# Patient Record
Sex: MALE | Race: BLACK OR AFRICAN AMERICAN | NOT HISPANIC OR LATINO | URBAN - METROPOLITAN AREA
[De-identification: names, ages, dates, MRNs, and addresses within clinical notes are randomized per-mention and may not be internally consistent; named-entity substitution may affect disease eponyms.]

---

## 2017-01-24 ENCOUNTER — APPOINTMENT (RX ONLY)
Dept: URBAN - METROPOLITAN AREA CLINIC 349 | Facility: CLINIC | Age: 32
Setting detail: DERMATOLOGY
End: 2017-01-24

## 2017-01-24 DIAGNOSIS — L70.0 ACNE VULGARIS: ICD-10-CM

## 2017-01-24 DIAGNOSIS — L74.51 PRIMARY FOCAL HYPERHIDROSIS: ICD-10-CM

## 2017-01-24 PROBLEM — L74.510 PRIMARY FOCAL HYPERHIDROSIS, AXILLA: Status: ACTIVE | Noted: 2017-01-24

## 2017-01-24 PROCEDURE — ? COUNSELING

## 2017-01-24 PROCEDURE — ? TREATMENT REGIMEN

## 2017-01-24 PROCEDURE — 99242 OFF/OP CONSLTJ NEW/EST SF 20: CPT

## 2017-01-24 PROCEDURE — ? PRESCRIPTION

## 2017-01-24 RX ORDER — CLINDAMYCIN PHOSPHATE 10 MG/ML
SOLUTION TOPICAL
Qty: 1 | Refills: 2 | Status: ERX | COMMUNITY
Start: 2017-01-24

## 2017-01-24 RX ORDER — BENZOYL PEROXIDE 9.8 G/100G
AEROSOL, FOAM TOPICAL
Qty: 1 | Refills: 2 | Status: ERX | COMMUNITY
Start: 2017-01-24

## 2017-01-24 RX ORDER — GLYCOPYRROLATE 1 MG/1
TABLET ORAL
Qty: 30 | Refills: 2 | Status: ERX | COMMUNITY
Start: 2017-01-24

## 2017-01-24 RX ADMIN — GLYCOPYRROLATE: 1 TABLET ORAL at 17:07

## 2017-01-24 RX ADMIN — CLINDAMYCIN PHOSPHATE: 10 SOLUTION TOPICAL at 17:08

## 2017-01-24 RX ADMIN — BENZOYL PEROXIDE: 9.8 AEROSOL, FOAM TOPICAL at 17:09

## 2017-01-24 ASSESSMENT — LOCATION DETAILED DESCRIPTION DERM
LOCATION DETAILED: INFERIOR MID FOREHEAD
LOCATION DETAILED: LEFT LATERAL MALAR CHEEK
LOCATION DETAILED: LEFT AXILLARY VAULT
LOCATION DETAILED: LEFT POSTERIOR SHOULDER
LOCATION DETAILED: LEFT CHIN
LOCATION DETAILED: RIGHT CENTRAL MALAR CHEEK
LOCATION DETAILED: RIGHT AXILLARY VAULT
LOCATION DETAILED: RIGHT POSTERIOR SHOULDER

## 2017-01-24 ASSESSMENT — LOCATION SIMPLE DESCRIPTION DERM
LOCATION SIMPLE: RIGHT AXILLARY VAULT
LOCATION SIMPLE: LEFT SHOULDER
LOCATION SIMPLE: LEFT CHEEK
LOCATION SIMPLE: CHIN
LOCATION SIMPLE: INFERIOR FOREHEAD
LOCATION SIMPLE: LEFT AXILLARY VAULT
LOCATION SIMPLE: RIGHT CHEEK
LOCATION SIMPLE: RIGHT SHOULDER

## 2017-01-24 ASSESSMENT — LOCATION ZONE DERM
LOCATION ZONE: AXILLAE
LOCATION ZONE: ARM
LOCATION ZONE: FACE

## 2017-01-24 NOTE — HPI: SWEATING (HYPERHIDROSIS)
How Severe Is It?: moderate
Sweating Severity Scale: 3- The sweating is barely tolerable and frequently interferes with daily activities
Is This A New Presentation, Or A Follow-Up?: Sweating

## 2017-01-24 NOTE — PROCEDURE: TREATMENT REGIMEN
Initiate Treatment: Certin dri to use at night time.  Robinal to take 1 PO QD
Detail Level: Detailed
Continue Regimen: Current regimen of Benzepro foam to use pre shower leaving 3-5 minutes then rinsing off

## 2017-02-02 ENCOUNTER — HOSPITAL ENCOUNTER (OUTPATIENT)
Dept: CT IMAGING | Age: 32
Discharge: HOME | End: 2017-02-02
Attending: UROLOGY
Payer: COMMERCIAL

## 2017-02-02 DIAGNOSIS — R31.29 MICROSCOPIC HEMATURIA: ICD-10-CM

## 2017-02-02 PROCEDURE — 74011636320 HC RX REV CODE- 636/320: Performed by: UROLOGY

## 2017-02-02 PROCEDURE — 74011000258 HC RX REV CODE- 258: Performed by: UROLOGY

## 2017-02-02 PROCEDURE — 74178 CT ABD&PLV WO CNTR FLWD CNTR: CPT

## 2017-02-02 RX ORDER — SODIUM CHLORIDE 0.9 % (FLUSH) 0.9 %
10 SYRINGE (ML) INJECTION
Status: COMPLETED | OUTPATIENT
Start: 2017-02-02 | End: 2017-02-02

## 2017-02-02 RX ADMIN — IOPAMIDOL 100 ML: 755 INJECTION, SOLUTION INTRAVENOUS at 16:07

## 2017-02-02 RX ADMIN — Medication 10 ML: at 16:07

## 2017-02-02 RX ADMIN — SODIUM CHLORIDE 100 ML: 900 INJECTION, SOLUTION INTRAVENOUS at 16:08

## 2017-03-20 PROBLEM — G47.10 HYPERSOMNIA: Status: ACTIVE | Noted: 2017-03-20

## 2017-03-20 PROBLEM — R63.5 WEIGHT GAIN: Status: ACTIVE | Noted: 2017-03-20

## 2017-03-20 PROBLEM — R29.818 SUSPECTED SLEEP APNEA: Status: ACTIVE | Noted: 2017-03-20

## 2017-03-21 ENCOUNTER — APPOINTMENT (RX ONLY)
Dept: URBAN - METROPOLITAN AREA CLINIC 349 | Facility: CLINIC | Age: 32
Setting detail: DERMATOLOGY
End: 2017-03-21

## 2017-03-21 DIAGNOSIS — L28.0 LICHEN SIMPLEX CHRONICUS: ICD-10-CM

## 2017-03-21 DIAGNOSIS — L74.51 PRIMARY FOCAL HYPERHIDROSIS: ICD-10-CM

## 2017-03-21 DIAGNOSIS — L70.0 ACNE VULGARIS: ICD-10-CM

## 2017-03-21 PROBLEM — L74.510 PRIMARY FOCAL HYPERHIDROSIS, AXILLA: Status: ACTIVE | Noted: 2017-03-21

## 2017-03-21 PROCEDURE — ? INTRALESIONAL KENALOG

## 2017-03-21 PROCEDURE — ? OTHER

## 2017-03-21 PROCEDURE — ? TREATMENT REGIMEN

## 2017-03-21 PROCEDURE — 99214 OFFICE O/P EST MOD 30 MIN: CPT | Mod: 25

## 2017-03-21 PROCEDURE — ? COUNSELING

## 2017-03-21 PROCEDURE — 11900 INJECT SKIN LESIONS </W 7: CPT

## 2017-03-21 PROCEDURE — ? PRESCRIPTION

## 2017-03-21 RX ORDER — GLYCOPYRROLATE 1 MG/1
TABLET ORAL
Qty: 60 | Refills: 2 | Status: ERX

## 2017-03-21 RX ORDER — BENZOYL PEROXIDE 9.8 G/100G
AEROSOL, FOAM TOPICAL
Qty: 1 | Refills: 3 | Status: ERX

## 2017-03-21 ASSESSMENT — LOCATION SIMPLE DESCRIPTION DERM
LOCATION SIMPLE: RIGHT BUTTOCK
LOCATION SIMPLE: LEFT AXILLARY VAULT
LOCATION SIMPLE: RIGHT CHEEK
LOCATION SIMPLE: CHIN
LOCATION SIMPLE: LEFT CHEEK
LOCATION SIMPLE: INFERIOR FOREHEAD
LOCATION SIMPLE: RIGHT AXILLARY VAULT

## 2017-03-21 ASSESSMENT — LOCATION ZONE DERM
LOCATION ZONE: TRUNK
LOCATION ZONE: AXILLAE
LOCATION ZONE: FACE

## 2017-03-21 ASSESSMENT — LOCATION DETAILED DESCRIPTION DERM
LOCATION DETAILED: INFERIOR MID FOREHEAD
LOCATION DETAILED: LEFT CENTRAL MALAR CHEEK
LOCATION DETAILED: RIGHT MEDIAL BUTTOCK
LOCATION DETAILED: LEFT CHIN
LOCATION DETAILED: RIGHT CENTRAL MALAR CHEEK
LOCATION DETAILED: RIGHT AXILLARY VAULT
LOCATION DETAILED: LEFT AXILLARY VAULT

## 2017-03-21 NOTE — PROCEDURE: TREATMENT REGIMEN
Continue Regimen: Current regimen of BenEfoam
Detail Level: Detailed
Initiate Treatment: Start Clindamycin solution
Modify Regimen: Increase Robinal to twice daily
Continue Regimen: Current regimen of Drysol

## 2017-03-21 NOTE — PROCEDURE: OTHER
Detail Level: Zone
Note Text (......Xxx Chief Complaint.): Photographs taken with pt permission
Other (Free Text): Will follow up in 1 month to check area

## 2017-04-27 ENCOUNTER — APPOINTMENT (RX ONLY)
Dept: URBAN - METROPOLITAN AREA CLINIC 349 | Facility: CLINIC | Age: 32
Setting detail: DERMATOLOGY
End: 2017-04-27

## 2017-04-27 DIAGNOSIS — L70.0 ACNE VULGARIS: ICD-10-CM

## 2017-04-27 DIAGNOSIS — L28.0 LICHEN SIMPLEX CHRONICUS: ICD-10-CM

## 2017-04-27 DIAGNOSIS — L74.51 PRIMARY FOCAL HYPERHIDROSIS: ICD-10-CM

## 2017-04-27 PROBLEM — L74.510 PRIMARY FOCAL HYPERHIDROSIS, AXILLA: Status: ACTIVE | Noted: 2017-04-27

## 2017-04-27 PROCEDURE — ? COUNSELING

## 2017-04-27 PROCEDURE — ? OTHER

## 2017-04-27 PROCEDURE — ? PRESCRIPTION

## 2017-04-27 PROCEDURE — 99213 OFFICE O/P EST LOW 20 MIN: CPT

## 2017-04-27 RX ORDER — TRIAMCINOLONE ACETONIDE 1 MG/G
CREAM TOPICAL BID
Qty: 1 | Refills: 2 | Status: ERX | COMMUNITY
Start: 2017-04-27

## 2017-04-27 RX ORDER — CLINDAMYCIN PHOSPHATE 10 MG/ML
SOLUTION TOPICAL
Qty: 1 | Refills: 6 | Status: ERX | COMMUNITY
Start: 2017-04-27

## 2017-04-27 RX ADMIN — TRIAMCINOLONE ACETONIDE: 1 CREAM TOPICAL at 15:36

## 2017-04-27 RX ADMIN — CLINDAMYCIN PHOSPHATE: 10 SOLUTION TOPICAL at 15:34

## 2017-04-27 ASSESSMENT — LOCATION SIMPLE DESCRIPTION DERM
LOCATION SIMPLE: LEFT EAR
LOCATION SIMPLE: LEFT AXILLARY VAULT
LOCATION SIMPLE: RIGHT SHOULDER
LOCATION SIMPLE: CHIN
LOCATION SIMPLE: LEFT SHOULDER
LOCATION SIMPLE: CHEST
LOCATION SIMPLE: RIGHT AXILLARY VAULT
LOCATION SIMPLE: RIGHT CHEEK

## 2017-04-27 ASSESSMENT — LOCATION ZONE DERM
LOCATION ZONE: TRUNK
LOCATION ZONE: FACE
LOCATION ZONE: AXILLAE
LOCATION ZONE: ARM
LOCATION ZONE: EAR

## 2017-04-27 ASSESSMENT — LOCATION DETAILED DESCRIPTION DERM
LOCATION DETAILED: RIGHT LATERAL SUPERIOR CHEST
LOCATION DETAILED: RIGHT POSTERIOR SHOULDER
LOCATION DETAILED: LEFT AXILLARY VAULT
LOCATION DETAILED: LEFT POSTERIOR SHOULDER
LOCATION DETAILED: RIGHT INFERIOR CENTRAL MALAR CHEEK
LOCATION DETAILED: LEFT LATERAL SUPERIOR CHEST
LOCATION DETAILED: LEFT TRAGUS
LOCATION DETAILED: RIGHT AXILLARY VAULT
LOCATION DETAILED: LEFT CHIN

## 2017-04-27 NOTE — PROCEDURE: OTHER
Detail Level: Detailed
Note Text (......Xxx Chief Complaint.): Photographs taken with pt permission
Other (Free Text): Patient advised that if he calls for a topical for the underarms we can call something in.Pt find out if he was using certain dry or Drysol.

## 2017-07-06 PROBLEM — Z20.2 SEXUALLY TRANSMITTED DISEASE EXPOSURE: Status: ACTIVE | Noted: 2017-07-06

## 2017-07-06 PROBLEM — Z20.2 SEXUALLY TRANSMITTED DISEASE EXPOSURE: Status: RESOLVED | Noted: 2017-07-06 | Resolved: 2017-07-06

## 2017-12-12 PROBLEM — R63.5 WEIGHT GAIN: Status: RESOLVED | Noted: 2017-03-20 | Resolved: 2017-12-12

## 2017-12-13 ENCOUNTER — HOSPITAL ENCOUNTER (OUTPATIENT)
Dept: CT IMAGING | Age: 32
Discharge: HOME | End: 2017-12-13
Attending: UROLOGY
Payer: COMMERCIAL

## 2017-12-13 DIAGNOSIS — N20.0 KIDNEY STONE: ICD-10-CM

## 2017-12-13 PROCEDURE — 74176 CT ABD & PELVIS W/O CONTRAST: CPT

## 2017-12-13 NOTE — PROGRESS NOTES
Please inform the patient that there is an 8 mm linear stone without obstruction to the kidney this may be in the lower aspect of the right ureter or it could even be in the bladder at this point. Based on his symptoms I suspect it is still in the ureter.   I would recommend he be set up for cystoscopy and probable right ureteroscopy and laser lithotripsy and right stent

## 2017-12-18 ENCOUNTER — ANESTHESIA EVENT (OUTPATIENT)
Dept: SURGERY | Age: 32
End: 2017-12-18
Payer: COMMERCIAL

## 2017-12-19 ENCOUNTER — HOSPITAL ENCOUNTER (OUTPATIENT)
Age: 32
Setting detail: OUTPATIENT SURGERY
Discharge: HOME | End: 2017-12-19
Attending: UROLOGY | Admitting: UROLOGY
Payer: COMMERCIAL

## 2017-12-19 ENCOUNTER — ANESTHESIA (OUTPATIENT)
Dept: SURGERY | Age: 32
End: 2017-12-19
Payer: COMMERCIAL

## 2017-12-19 VITALS
HEIGHT: 70 IN | OXYGEN SATURATION: 100 % | RESPIRATION RATE: 17 BRPM | DIASTOLIC BLOOD PRESSURE: 86 MMHG | TEMPERATURE: 98.3 F | SYSTOLIC BLOOD PRESSURE: 128 MMHG | BODY MASS INDEX: 23.05 KG/M2 | WEIGHT: 161 LBS | HEART RATE: 83 BPM

## 2017-12-19 PROCEDURE — 76210000006 HC OR PH I REC 0.5 TO 1 HR: Performed by: UROLOGY

## 2017-12-19 PROCEDURE — C1769 GUIDE WIRE: HCPCS | Performed by: UROLOGY

## 2017-12-19 PROCEDURE — 77030018832 HC SOL IRR H20 ICUM -A: Performed by: UROLOGY

## 2017-12-19 PROCEDURE — 74011250637 HC RX REV CODE- 250/637: Performed by: ANESTHESIOLOGY

## 2017-12-19 PROCEDURE — 74011250636 HC RX REV CODE- 250/636: Performed by: ANESTHESIOLOGY

## 2017-12-19 PROCEDURE — 77030020782 HC GWN BAIR PAWS FLX 3M -B: Performed by: ANESTHESIOLOGY

## 2017-12-19 PROCEDURE — C1758 CATHETER, URETERAL: HCPCS | Performed by: UROLOGY

## 2017-12-19 PROCEDURE — 77030032490 HC SLV COMPR SCD KNE COVD -B: Performed by: UROLOGY

## 2017-12-19 PROCEDURE — 76010000149 HC OR TIME 1 TO 1.5 HR: Performed by: UROLOGY

## 2017-12-19 PROCEDURE — 74011250636 HC RX REV CODE- 250/636

## 2017-12-19 PROCEDURE — 77030020143 HC AIRWY LARYN INTUB CGAS -A: Performed by: ANESTHESIOLOGY

## 2017-12-19 PROCEDURE — 74011250636 HC RX REV CODE- 250/636: Performed by: UROLOGY

## 2017-12-19 PROCEDURE — 77030019927 HC TBNG IRR CYSTO BAXT -A: Performed by: UROLOGY

## 2017-12-19 PROCEDURE — 76060000033 HC ANESTHESIA 1 TO 1.5 HR: Performed by: UROLOGY

## 2017-12-19 PROCEDURE — 74011000250 HC RX REV CODE- 250: Performed by: UROLOGY

## 2017-12-19 PROCEDURE — 76210000020 HC REC RM PH II FIRST 0.5 HR: Performed by: UROLOGY

## 2017-12-19 PROCEDURE — 74011000250 HC RX REV CODE- 250

## 2017-12-19 PROCEDURE — C2617 STENT, NON-COR, TEM W/O DEL: HCPCS | Performed by: UROLOGY

## 2017-12-19 DEVICE — URETERAL STENT
Type: IMPLANTABLE DEVICE | Site: URETER | Status: FUNCTIONAL
Brand: PERCUFLEX™ PLUS

## 2017-12-19 RX ORDER — FENTANYL CITRATE 50 UG/ML
INJECTION, SOLUTION INTRAMUSCULAR; INTRAVENOUS AS NEEDED
Status: DISCONTINUED | OUTPATIENT
Start: 2017-12-19 | End: 2017-12-19 | Stop reason: HOSPADM

## 2017-12-19 RX ORDER — CIPROFLOXACIN 500 MG/1
500 TABLET ORAL 2 TIMES DAILY
Qty: 14 TAB | Refills: 0 | Status: SHIPPED | OUTPATIENT
Start: 2017-12-19 | End: 2018-01-04

## 2017-12-19 RX ORDER — ONDANSETRON 2 MG/ML
INJECTION INTRAMUSCULAR; INTRAVENOUS AS NEEDED
Status: DISCONTINUED | OUTPATIENT
Start: 2017-12-19 | End: 2017-12-19 | Stop reason: HOSPADM

## 2017-12-19 RX ORDER — OXYCODONE HYDROCHLORIDE 5 MG/1
10 TABLET ORAL
Status: DISCONTINUED | OUTPATIENT
Start: 2017-12-19 | End: 2018-01-11 | Stop reason: HOSPADM

## 2017-12-19 RX ORDER — ACETAMINOPHEN 500 MG
1000 TABLET ORAL ONCE
Status: COMPLETED | OUTPATIENT
Start: 2017-12-19 | End: 2017-12-19

## 2017-12-19 RX ORDER — PROPOFOL 10 MG/ML
INJECTION, EMULSION INTRAVENOUS AS NEEDED
Status: DISCONTINUED | OUTPATIENT
Start: 2017-12-19 | End: 2017-12-19 | Stop reason: HOSPADM

## 2017-12-19 RX ORDER — SODIUM CHLORIDE, SODIUM LACTATE, POTASSIUM CHLORIDE, CALCIUM CHLORIDE 600; 310; 30; 20 MG/100ML; MG/100ML; MG/100ML; MG/100ML
75 INJECTION, SOLUTION INTRAVENOUS CONTINUOUS
Status: DISCONTINUED | OUTPATIENT
Start: 2017-12-19 | End: 2018-01-11 | Stop reason: HOSPADM

## 2017-12-19 RX ORDER — LIDOCAINE HYDROCHLORIDE 10 MG/ML
0.1 INJECTION INFILTRATION; PERINEURAL AS NEEDED
Status: DISCONTINUED | OUTPATIENT
Start: 2017-12-19 | End: 2017-12-19 | Stop reason: HOSPADM

## 2017-12-19 RX ORDER — SODIUM CHLORIDE, SODIUM LACTATE, POTASSIUM CHLORIDE, CALCIUM CHLORIDE 600; 310; 30; 20 MG/100ML; MG/100ML; MG/100ML; MG/100ML
75 INJECTION, SOLUTION INTRAVENOUS CONTINUOUS
Status: DISCONTINUED | OUTPATIENT
Start: 2017-12-19 | End: 2017-12-19 | Stop reason: HOSPADM

## 2017-12-19 RX ORDER — NALOXONE HYDROCHLORIDE 0.4 MG/ML
0.1 INJECTION, SOLUTION INTRAMUSCULAR; INTRAVENOUS; SUBCUTANEOUS
Status: DISCONTINUED | OUTPATIENT
Start: 2017-12-19 | End: 2018-01-11 | Stop reason: HOSPADM

## 2017-12-19 RX ORDER — MIDAZOLAM HYDROCHLORIDE 1 MG/ML
2 INJECTION, SOLUTION INTRAMUSCULAR; INTRAVENOUS
Status: COMPLETED | OUTPATIENT
Start: 2017-12-19 | End: 2017-12-19

## 2017-12-19 RX ORDER — HYDROMORPHONE HYDROCHLORIDE 2 MG/ML
0.5 INJECTION, SOLUTION INTRAMUSCULAR; INTRAVENOUS; SUBCUTANEOUS
Status: DISCONTINUED | OUTPATIENT
Start: 2017-12-19 | End: 2018-01-11 | Stop reason: HOSPADM

## 2017-12-19 RX ORDER — PHENAZOPYRIDINE HYDROCHLORIDE 200 MG/1
200 TABLET, FILM COATED ORAL
Qty: 12 TAB | Refills: 0 | Status: SHIPPED | OUTPATIENT
Start: 2017-12-19 | End: 2017-12-23

## 2017-12-19 RX ORDER — OXYCODONE HYDROCHLORIDE 5 MG/1
5 TABLET ORAL
Status: DISCONTINUED | OUTPATIENT
Start: 2017-12-19 | End: 2018-01-11 | Stop reason: HOSPADM

## 2017-12-19 RX ORDER — DIPHENHYDRAMINE HYDROCHLORIDE 50 MG/ML
12.5 INJECTION, SOLUTION INTRAMUSCULAR; INTRAVENOUS
Status: DISCONTINUED | OUTPATIENT
Start: 2017-12-19 | End: 2018-01-11 | Stop reason: HOSPADM

## 2017-12-19 RX ORDER — LIDOCAINE HYDROCHLORIDE 20 MG/ML
JELLY TOPICAL AS NEEDED
Status: DISCONTINUED | OUTPATIENT
Start: 2017-12-19 | End: 2017-12-19 | Stop reason: HOSPADM

## 2017-12-19 RX ORDER — FLUMAZENIL 0.1 MG/ML
0.2 INJECTION INTRAVENOUS AS NEEDED
Status: DISCONTINUED | OUTPATIENT
Start: 2017-12-19 | End: 2018-01-11 | Stop reason: HOSPADM

## 2017-12-19 RX ORDER — OXYCODONE AND ACETAMINOPHEN 7.5; 325 MG/1; MG/1
1 TABLET ORAL
Qty: 30 TAB | Refills: 0 | Status: SHIPPED | OUTPATIENT
Start: 2017-12-19 | End: 2018-01-04

## 2017-12-19 RX ORDER — DEXAMETHASONE SODIUM PHOSPHATE 4 MG/ML
INJECTION, SOLUTION INTRA-ARTICULAR; INTRALESIONAL; INTRAMUSCULAR; INTRAVENOUS; SOFT TISSUE AS NEEDED
Status: DISCONTINUED | OUTPATIENT
Start: 2017-12-19 | End: 2017-12-19 | Stop reason: HOSPADM

## 2017-12-19 RX ORDER — CIPROFLOXACIN 2 MG/ML
400 INJECTION, SOLUTION INTRAVENOUS ONCE
Status: COMPLETED | OUTPATIENT
Start: 2017-12-19 | End: 2017-12-19

## 2017-12-19 RX ORDER — LIDOCAINE HYDROCHLORIDE 20 MG/ML
INJECTION, SOLUTION EPIDURAL; INFILTRATION; INTRACAUDAL; PERINEURAL AS NEEDED
Status: DISCONTINUED | OUTPATIENT
Start: 2017-12-19 | End: 2017-12-19 | Stop reason: HOSPADM

## 2017-12-19 RX ADMIN — FENTANYL CITRATE 50 MCG: 50 INJECTION, SOLUTION INTRAMUSCULAR; INTRAVENOUS at 09:36

## 2017-12-19 RX ADMIN — OXYCODONE HYDROCHLORIDE 5 MG: 5 TABLET ORAL at 10:55

## 2017-12-19 RX ADMIN — ONDANSETRON 4 MG: 2 INJECTION INTRAMUSCULAR; INTRAVENOUS at 10:10

## 2017-12-19 RX ADMIN — SODIUM CHLORIDE, SODIUM LACTATE, POTASSIUM CHLORIDE, AND CALCIUM CHLORIDE 75 ML/HR: 600; 310; 30; 20 INJECTION, SOLUTION INTRAVENOUS at 07:58

## 2017-12-19 RX ADMIN — ACETAMINOPHEN 1000 MG: 500 TABLET, FILM COATED ORAL at 07:57

## 2017-12-19 RX ADMIN — MIDAZOLAM HYDROCHLORIDE 2 MG: 1 INJECTION, SOLUTION INTRAMUSCULAR; INTRAVENOUS at 09:20

## 2017-12-19 RX ADMIN — DEXAMETHASONE SODIUM PHOSPHATE 4 MG: 4 INJECTION, SOLUTION INTRA-ARTICULAR; INTRALESIONAL; INTRAMUSCULAR; INTRAVENOUS; SOFT TISSUE at 10:10

## 2017-12-19 RX ADMIN — PROPOFOL 200 MG: 10 INJECTION, EMULSION INTRAVENOUS at 09:36

## 2017-12-19 RX ADMIN — LIDOCAINE HYDROCHLORIDE 60 MG: 20 INJECTION, SOLUTION EPIDURAL; INFILTRATION; INTRACAUDAL; PERINEURAL at 09:36

## 2017-12-19 RX ADMIN — CIPROFLOXACIN 400 MG: 2 INJECTION, SOLUTION INTRAVENOUS at 09:33

## 2017-12-19 NOTE — IP AVS SNAPSHOT
[address] [facility] 
 
 
 [address] 
[phone] Patient: Kellee Hope MRN: [id number] :1985 About your hospitalization You were admitted on:  2017 You last received care in the:  [facility] SURGERY You were discharged on:  2017 Why you were hospitalized Your primary diagnosis was:  Not on File Things You Need To Do (next 8 weeks) Follow up with Aida Watkins MD  
  
Phone:  [phone] Where:  [address]  Office Visit with Jen Mercedes MD at  4:10 PM  
Where:  [facility] ([address]) Discharge Orders None A check beata indicates which time of day the medication should be taken. My Medications TAKE these medications as instructed Instructions Each Dose to Equal  
 Morning Noon Evening Bedtime  
 ciprofloxacin HCl 500 mg tablet Commonly known as:  CIPRO Your last dose was: Your next dose is: Take 1 Tab by mouth two (2) times a day. 500 mg HYDROcodone-acetaminophen  mg tablet Commonly known as:  Zilphia Deck Your last dose was: Your next dose is: Take 1 Tab by mouth every six (6) hours as needed for Pain. Max Daily Amount: 4 Tabs. 1 Tab  
    
   
   
   
  
 oxyCODONE-acetaminophen 7.5-325 mg per tablet Commonly known as:  PERCOCET 7.5 Your last dose was: Your next dose is: Take 1 Tab by mouth every four (4) hours as needed for Pain. Max Daily Amount: 6 Tabs. 1 Tab  
    
   
   
   
  
 phenazopyridine 200 mg tablet Commonly known as:  PYRIDIUM Your last dose was: Your next dose is: Take 1 Tab by mouth three (3) times daily as needed for Pain for up to 4 days.  Indications: Dysuria  
 200 mg  
    
   
   
   
 traMADol 50 mg tablet Commonly known as:  ULTRAM  
   
Your last dose was: Your next dose is: Take 1 Tab by mouth every six (6) hours as needed for Pain. Max Daily Amount: 200 mg.  
 50 mg Where to Get Your Medications Information on where to get these meds will be given to you by the nurse or doctor. ! Ask your nurse or doctor about these medications  
  ciprofloxacin HCl 500 mg tablet  
 oxyCODONE-acetaminophen 7.5-325 mg per tablet  
 phenazopyridine 200 mg tablet Discharge Instructions Ureteral Stent Placement: What to Expect at Home Your Recovery A ureteral (say \"you-REE-ter-ul\") stent is a thin, hollow tube that is placed in the ureter to help urine pass from the kidney into the bladder. Ureters are the tubes that connect the kidneys to the bladder. You may have a small amount of blood in your urine for 1 to 3 days after the procedure. While the stent is in place, you may have to urinate more often, feel a sudden need to urinate, or feel like you cannot completely empty your bladder. You may feel some pain when you urinate or do strenuous activity. You also may notice a small amount of blood in your urine after strenuous activities. These side effects usually do not prevent people from doing their normal daily activities. Your urethra is the tube that carries urine from your bladder to outside your body. This string is attached to the stent. Try not to pull on the string. The doctor will use the string to pull out the stent when you no longer need it. After the procedure, urine may flow better from your kidneys to your bladder. A ureteral stent may be left in place for several days or for as long as several months. Your doctor will take it out when you no longer need it. This care sheet gives you a general idea about how long it will take for you to recover. But each person recovers at a different pace.  Follow the steps below to get better as quickly as possible. Cystoscopy: What to Expect at [facility] Your Recovery A cystoscopy is a procedure that lets a doctor look inside of the bladder and the urethra. The urethra is the tube that carries urine from the bladder to outside the body. The doctor uses a thin, lighted tube called a cystoscope to look for kidney or bladder stones, tumors, bleeding, or infection. After the cystoscopy, your urethra may be sore at first, and it may burn when you urinate for the first few days after the procedure. You may feel the need to urinate more often, and your urine may be pink. These symptoms should get better in 1 or 2 days. You will probably be able to go back to work or most of your usual activities in 1 or 2 days. This care sheet gives you a general idea about how long it will take for you to recover. But each person recovers at a different pace. Follow the steps below to get better as quickly as possible. How can you care for yourself at home? Activity 1. Rest when you feel tired. Getting enough sleep will help you recover. 2. Try to walk each day. Start by walking a little more than you did the day before. Bit by bit, increase the amount you walk. Walking boosts blood flow and helps prevent pneumonia and constipation. 3. Avoid strenuous activities, such as bicycle riding, jogging, weight lifting, or aerobic exercise, until your doctor says it is okay. 4. Ask your doctor when you can drive again. 5. Most people are able to return to work within 1 or 2 days after the procedure. 6. You may shower and take baths as usual. 
7. Ask your doctor when it is okay for you to have sex. Diet · You can eat your normal diet. If your stomach is upset, try bland, low-fat foods like plain rice, broiled chicken, toast, and yogurt. · Drink plenty of fluids (unless your doctor tells you not to). Medicines · Take pain medicines exactly as directed. ¨ If the doctor gave you a prescription medicine for pain, take it as prescribed. ¨ If you are not taking a prescription pain medicine, ask your doctor if you can take an over-the-counter medicine. · If you think your pain medicine is making you sick to your stomach: 
¨ Take your medicine after meals (unless your doctor has told you not to). ¨ Ask your doctor for a different pain medicine. · If your doctor prescribed antibiotics, take them as directed. Do not stop taking them just because you feel better. You need to take the full course of antibiotics. Follow-up care is a key part of your treatment and safety. Be sure to make and go to all appointments, and call your doctor if you are having problems. It's also a good idea to know your test results and keep a list of the medicines you take. When should you call for help? Call 911 anytime you think you may need emergency care. For example, call if: 
· You passed out (lost consciousness). · You have severe trouble breathing. · You have sudden chest pain and shortness of breath, or you cough up blood. · You have severe belly pain. Call your doctor now or seek immediate medical care if: 
· You are sick to your stomach or cannot keep fluids down. · Your urine is still red or you see blood clots after you have urinated several times. · You have trouble passing urine or stool, especially if you have pain or swelling in your lower belly. · You have signs of a blood clot, such as: 
¨ Pain in your calf, back of the knee, thigh, or groin. ¨ Redness and swelling in your leg or groin. · You develop a fever or severe chills. · You have pain in your back just below your rib cage. This is called flank pain. Watch closely for changes in your health, and be sure to contact your doctor if: 
· You have pain or burning when you urinate. A burning feeling is normal for a day or two after the test, but call if it does not get better. · You have a frequent urge to urinate but can pass only small amounts of urine. Your urine is pink, red, or cloudy, or smells bad. It is normal for the urine to have a pinkish color for a few days after the test, but call if it does not get better. After general anesthesia or intravenous sedation, for 24 hours or while taking prescription Narcotics: · Limit your activities · Do not drive and operate hazardous machinery · Do not make important personal or business decisions · Do  not drink alcoholic beverages · If you have not urinated within 8 hours after discharge, please contact your surgeon on call. *  Please give a list of your current medications to your Primary Care Provider. *  Please update this list whenever your medications are discontinued, doses are 
    changed, or new medications (including over-the-counter products) are added. *  Please carry medication information at all times in case of emergency situations. These are general instructions for a healthy lifestyle: No smoking/ No tobacco products/ Avoid exposure to second hand smoke Surgeon General's Warning:  Quitting smoking now greatly reduces serious risk to your health. Obesity, smoking, and sedentary lifestyle greatly increases your risk for illness A healthy diet, regular physical exercise & weight monitoring are important for maintaining a healthy lifestyle You may be retaining fluid if you have a history of heart failure or if you experience any of the following symptoms:  Weight gain of 3 pounds or more overnight or 5 pounds in a week, increased swelling in our hands or feet or shortness of breath while lying flat in bed. Please call your doctor as soon as you notice any of these symptoms; do not wait until your next office visit. Recognize signs and symptoms of STROKE: 
 
F-face looks uneven A-arms unable to move or move unevenly S-speech slurred or non-existent T-time-call 911 as soon as signs and symptoms begin-DO NOT go Back to bed or wait to see if you get better-TIME IS BRAIN. Introducing [facility]! Dear Judy Montoya: Thank you for requesting a [employer] account. Our records indicate that you already have an active [employer] account. You can access your account anytime at [email or web address] Did you know that you can access your hospital and ER discharge instructions at any time in [employer]? You can also review all of your test results from your hospital stay or ER visit. Additional Information If you have questions, please visit the Frequently Asked Questions section of the [employer] website at [email or web address] Remember, [employer] is NOT to be used for urgent needs. For medical emergencies, dial 911. Now available from your iPhone and Android! Providers Seen During Your Hospitalization Provider Specialty Primary office phone Cece Thomas MD Urology [phone] Your Primary Care Physician (PCP) Primary Care Physician Office Phone Office Fax [place], [address] [facility] [place] [phone] You are allergic to the following No active allergies Recent Documentation Height Weight BMI Smoking Status 1.778 m 73 kg 23.1 kg/m2 Never Smoker Emergency Contacts Name Discharge Info Relation Home Work Mobile [facility]  Mother [14] [phone]  [phone] Mignon Fisher  Girlfriend [18] [phone] Patient Belongings The following personal items are in your possession at time of discharge: 
  Dental Appliances: None  Visual Aid: Glasses      Home Medications: None   Jewelry: None  Clothing: Shirt, Undergarments, Footwear, Pants    Other Valuables: None Please provide this summary of care documentation to your next provider. Signatures-by signing, you are acknowledging that this After Visit Summary has been reviewed with you and you have received a copy. Patient Signature:  ____________________________________________________________ Date:  ____________________________________________________________  
  
Monique Gracie Provider Signature:  ____________________________________________________________ Date:  ____________________________________________________________

## 2017-12-19 NOTE — OP NOTES
[address] [facility]  OPERATIVE REPORT    Lilian Orourke  MR#: [id number]  : 1985  ACCOUNT #: [de-identified]   DATE OF SERVICE: 2017    SURGEON:  PARKER Starr M.D. PREOPERATIVE DIAGNOSIS:  Probable right distal ureteral stone. POSTOPERATIVE DIAGNOSIS:  No evidence of ureteral or bladder stone. PROCEDURE PERFORMED:  1. Cystoscopy. 2.  Right ureteroscopy. 3.  Right ureteral stent insertion. ANESTHESIA: GEN LMA    ESTIMATED BLOOD LOSS:   <3ml    SPECIMENS REMOVED: 0    COMPLICATIONS:  0    INDICATIONS FOR PROCEDURE:  The patient is a 77-year-old black male with symptoms of urgency, frequency, and abdominal pain. The patient was worked up previously for microscopic hematuria with a negative cystoscopy in our office and a contrasted CT that showed a 2 x 4 mm medullary right renal stone. The patient's primary care physician did an x-ray in the office, which showed a calcification in the pelvis. This led to a CT scan which revealed an 8 mm linear stone which was felt to be in the bladder near the right UVJ. Based on the patient's symptoms, I felt that the patient might well have a stone in the distal ureter. The CT was reviewed. There is a calcification that could have been in the ureter and/or the bladder. Based again on the patient's continued symptoms and the CT findings, recommendation was for cystoscopy with ureteroscopy, possible laser lithotripsy, and right ureteral stent. Risks not exclusive of infection, bleeding, possible ureteral injury, etc. were discussed in detail with the patient. DESCRIPTION OF PROCEDURE:  The patient received Cipro 400 mg IV piggyback. It should be noted that the patient previously had a negative urine culture. Cystoscopy was carried out with a 25 Mongolian sheath 30 degree lens and video camera with findings of a normal urethra. The prostate showed an elevated median bar. The bladder was free of tumors, stones or foreign bodies. Initial attempts to place a standard guidewire up the ureter were unsuccessful; however, with an open-ended catheter I was able to easily get ultimately a guidewire up the ureter and into the kidney. A 6 Bruneian ureteroscope then was negotiated adjacent to the guidewire and no stone was visualized in the distal ureter. The scope was advanced all the way to the level of the kidney and then brought back down slowly and no calcification was seen within the ureter. Further fluoroscopy was carried out and there is a calcific density seen in the right pelvis, but this again did not appear to lie in the bladder as I visualized the bladder carefully again and I think this is posterior to the bladder. I would suspect this represents a phlebolith. The prostatic urethra was reinspected and there was no stone in that location either. A 6-Bruneian x 24 cm Percuflex stent with attached suture was threaded over the guidewire and manipulated into the kidney with good coil obtained in the kidney and good coil obtained in the bladder. The decision to place the stent was based on the fact that I did scope him all the way to the kidney and back and there was some small clot at the ureteral orifice and I felt that he might have edema that would cause swelling. The bladder then was drained. Sabrina Poke was injected into the urethra. The suture attached to the stent was taped securely to the penis. I have now gone back and reviewed the CT again and I suspect again this is a phlebolith. I am not sure of the nature of the patient's discomfort. He could have low grade urethritis or prostatitis or some other issue, but I do not think urolithiasis is the cause of his discomfort and I cannot even see the medullary stone seen on the prior Casimiro Freestone, MD Pattricia Riedel / JOSE DE JESUS  D: 12/19/2017 11:04     T: 12/19/2017 12:14  JOB #: [id number]  CC: Kwesi Mcduffie MD  [address] [address]

## 2017-12-19 NOTE — BRIEF OP NOTE
BRIEF OPERATIVE NOTE    Date of Procedure: 12/19/2017   Preoperative Diagnosis: Kidney stone [N20.0]  Postoperative Diagnosis: Kidney stone [N20.0]    Procedure(s):  RIGHT URETEROSCOPY AND RIGHT URETERAL STENT PLACEMENT  Surgeon(s) and Role:     * Landy Olson MD - Primary         Assistant Staff:       Surgical Staff:  Circ-1: David Mckeon RN  Circ-2: Deshaun Mack  Event Time In   Incision Start 2926   Incision Close 1020     Anesthesia: General   Estimated Blood Loss: <3ml  Specimens: * No specimens in log *   Findings: no stone in bladder or ureter  Complications: 0  Implants:   Implant Name Type Inv.  Item Serial No.  Lot No. LRB No. Used Action   [facility] [employer] [id number] -- [facility] - [id number]   [address] [place] [address] -- [place] [address] [id number] Right 1 Implanted         Op [facility]-[id number]

## 2017-12-19 NOTE — ANESTHESIA PREPROCEDURE EVALUATION
Anesthetic History   No history of anesthetic complications            Review of Systems / Medical History  Patient summary reviewed and pertinent labs reviewed    Pulmonary  Within defined limits                 Neuro/Psych   Within defined limits           Cardiovascular                  Exercise tolerance: >4 METS     GI/Hepatic/Renal         Renal disease: stones       Endo/Other  Within defined limits           Other Findings              Physical Exam    Airway  Mallampati: II  TM Distance: 4 - 6 cm  Neck ROM: normal range of motion   Mouth opening: Normal     Cardiovascular    Rhythm: regular  Rate: normal         Dental  No notable dental hx       Pulmonary  Breath sounds clear to auscultation               Abdominal         Other Findings            Anesthetic Plan    ASA: 1  Anesthesia type: general            Anesthetic plan and risks discussed with: Patient

## 2017-12-19 NOTE — ANESTHESIA POSTPROCEDURE EVALUATION
Post-Anesthesia Evaluation and Assessment    Patient: Zara Sesay MRN: [id number]  SSN: [id number]    YOB: 1985  Age: 27 y.o. Sex: male       Cardiovascular Function/Vital Signs  Visit Vitals    /86    Pulse 83    Temp 36.8 °C (98.3 °F)    Resp 17    Ht 5' 10\" (1.778 m)    Wt 73 kg (161 lb)    SpO2 100%    BMI 23.1 kg/m2       Patient is status post general anesthesia for Procedure(s):  RIGHT URETEROSCOPY AND RIGHT URETERAL STENT PLACEMENT. Nausea/Vomiting: None    Postoperative hydration reviewed and adequate. Pain:  Pain Scale 1: Numeric (0 - 10) (12/19/17 1056)  Pain Intensity 1: 3 (12/19/17 1056)   Managed    Neurological Status:   Neuro (WDL): Within Defined Limits (12/19/17 1037)  Neuro  LUE Motor Response: Purposeful (12/19/17 1037)  LLE Motor Response: Purposeful (12/19/17 1037)  RUE Motor Response: Purposeful (12/19/17 1037)  RLE Motor Response: Purposeful (12/19/17 1037)   At baseline    Mental Status and Level of Consciousness: Arousable    Pulmonary Status:   O2 Device: Nasal cannula (12/19/17 1037)   Adequate oxygenation and airway patent    Complications related to anesthesia: None    Post-anesthesia assessment completed.  No concerns    Signed By: Rosie Peña MD     December 19, 2017

## 2017-12-19 NOTE — DISCHARGE INSTRUCTIONS
Ureteral Stent Placement: What to Expect at [address] [place] [address]  A ureteral (say \"you-REE-ter-ul\") stent is a thin, hollow tube that is placed in the ureter to help urine pass from the kidney into the bladder. Ureters are the tubes that connect the kidneys to the bladder. You may have a small amount of blood in your urine for 1 to 3 days after the procedure. While the stent is in place, you may have to urinate more often, feel a sudden need to urinate, or feel like you cannot completely empty your bladder. You may feel some pain when you urinate or do strenuous activity. You also may notice a small amount of blood in your urine after strenuous activities. These side effects usually do not prevent people from doing their normal daily activities. Your urethra is the tube that carries urine from your bladder to outside your body. This string is attached to the stent. Try not to pull on the string. The doctor will use the string to pull out the stent when you no longer need it. After the procedure, urine may flow better from your kidneys to your bladder. A ureteral stent may be left in place for several days or for as long as several months. Your doctor will take it out when you no longer need it. This care sheet gives you a general idea about how long it will take for you to recover. But each person recovers at a different pace. Follow the steps below to get better as quickly as possible. Cystoscopy: What to Expect at [address] [place] [address]  A cystoscopy is a procedure that lets a doctor look inside of the bladder and the urethra. The urethra is the tube that carries urine from the bladder to outside the body. The doctor uses a thin, lighted tube called a cystoscope to look for kidney or bladder stones, tumors, bleeding, or infection. After the cystoscopy, your urethra may be sore at first, and it may burn when you urinate for the first few days after the procedure.  You may feel the need to urinate more often, and your urine may be pink. These symptoms should get better in 1 or 2 days. You will probably be able to go back to work or most of your usual activities in 1 or 2 days. This care sheet gives you a general idea about how long it will take for you to recover. But each person recovers at a different pace. Follow the steps below to get better as quickly as possible. How can you care for yourself at home? Activity  1. Rest when you feel tired. Getting enough sleep will help you recover. 2. Try to walk each day. Start by walking a little more than you did the day before. Bit by bit, increase the amount you walk. Walking boosts blood flow and helps prevent pneumonia and constipation. 3. Avoid strenuous activities, such as bicycle riding, jogging, weight lifting, or aerobic exercise, until your doctor says it is okay. 4. Ask your doctor when you can drive again. 5. Most people are able to return to work within 1 or 2 days after the procedure. 6. You may shower and take baths as usual.  7. Ask your doctor when it is okay for you to have sex. Diet  · You can eat your normal diet. If your stomach is upset, try bland, low-fat foods like plain rice, broiled chicken, toast, and yogurt. · Drink plenty of fluids (unless your doctor tells you not to). Medicines  · Take pain medicines exactly as directed. ¨ If the doctor gave you a prescription medicine for pain, take it as prescribed. ¨ If you are not taking a prescription pain medicine, ask your doctor if you can take an over-the-counter medicine. · If you think your pain medicine is making you sick to your stomach:  ¨ Take your medicine after meals (unless your doctor has told you not to). ¨ Ask your doctor for a different pain medicine. · If your doctor prescribed antibiotics, take them as directed. Do not stop taking them just because you feel better. You need to take the full course of antibiotics. Follow-up care is a key part of your treatment and safety.  Be sure to make and go to all appointments, and call your doctor if you are having problems. It's also a good idea to know your test results and keep a list of the medicines you take. When should you call for help? Call 911 anytime you think you may need emergency care. For example, call if:  · You passed out (lost consciousness). · You have severe trouble breathing. · You have sudden chest pain and shortness of breath, or you cough up blood. · You have severe belly pain. Call your doctor now or seek immediate medical care if:  · You are sick to your stomach or cannot keep fluids down. · Your urine is still red or you see blood clots after you have urinated several times. · You have trouble passing urine or stool, especially if you have pain or swelling in your lower belly. · You have signs of a blood clot, such as:  ¨ Pain in your calf, back of the knee, thigh, or groin. ¨ Redness and swelling in your leg or groin. · You develop a fever or severe chills. · You have pain in your back just below your rib cage. This is called flank pain. Watch closely for changes in your health, and be sure to contact your doctor if:  · You have pain or burning when you urinate. A burning feeling is normal for a day or two after the test, but call if it does not get better. · You have a frequent urge to urinate but can pass only small amounts of urine. Your urine is pink, red, or cloudy, or smells bad. It is normal for the urine to have a pinkish color for a few days after the test, but call if it does not get better. After general anesthesia or intravenous sedation, for 24 hours or while taking prescription Narcotics:  · Limit your activities  · Do not drive and operate hazardous machinery  · Do not make important personal or business decisions  · Do  not drink alcoholic beverages  · If you have not urinated within 8 hours after discharge, please contact your surgeon on call.     *  Please give a list of your current medications to your Primary Care Provider. *  Please update this list whenever your medications are discontinued, doses are      changed, or new medications (including over-the-counter products) are added. *  Please carry medication information at all times in case of emergency situations. These are general instructions for a healthy lifestyle:  No smoking/ No tobacco products/ Avoid exposure to second hand smoke  Surgeon General's Warning:  Quitting smoking now greatly reduces serious risk to your health. Obesity, smoking, and sedentary lifestyle greatly increases your risk for illness  A healthy diet, regular physical exercise & weight monitoring are important for maintaining a healthy lifestyle    You may be retaining fluid if you have a history of heart failure or if you experience any of the following symptoms:  Weight gain of 3 pounds or more overnight or 5 pounds in a week, increased swelling in our hands or feet or shortness of breath while lying flat in bed. Please call your doctor as soon as you notice any of these symptoms; do not wait until your next office visit. Recognize signs and symptoms of STROKE:    F-face looks uneven  A-arms unable to move or move unevenly  S-speech slurred or non-existent  T-time-call 911 as soon as signs and symptoms begin-DO NOT go       Back to bed or wait to see if you get better-TIME IS BRAIN.

## 2019-05-28 ENCOUNTER — HOSPITAL ENCOUNTER (OUTPATIENT)
Dept: CT IMAGING | Age: 34
Discharge: HOME | End: 2019-05-28
Attending: INTERNAL MEDICINE
Payer: COMMERCIAL

## 2019-05-28 DIAGNOSIS — R51.9 INTRACTABLE EPISODIC HEADACHE, UNSPECIFIED HEADACHE TYPE: ICD-10-CM

## 2019-05-28 PROCEDURE — 70450 CT HEAD/BRAIN W/O DYE: CPT

## 2019-05-28 NOTE — PROGRESS NOTES
CT head is normal. Sent Prednisone which is used to treat Cluster headaches and as needed Zofran and Imitrex.

## 2019-06-10 PROBLEM — N20.0 KIDNEY STONE: Status: ACTIVE | Noted: 2019-06-10

## 2019-06-11 PROBLEM — Z79.899 ENCOUNTER FOR MEDICATION MANAGEMENT: Status: ACTIVE | Noted: 2019-06-11

## 2019-06-11 PROBLEM — G44.001 INTRACTABLE CLUSTER HEADACHE SYNDROME: Status: ACTIVE | Noted: 2019-06-11

## 2022-03-18 PROBLEM — R29.818 SUSPECTED SLEEP APNEA: Status: ACTIVE | Noted: 2017-03-20

## 2022-03-18 PROBLEM — G44.001 INTRACTABLE CLUSTER HEADACHE SYNDROME: Status: ACTIVE | Noted: 2019-06-11

## 2022-03-19 PROBLEM — N20.0 KIDNEY STONE: Status: ACTIVE | Noted: 2019-06-10

## 2022-03-19 PROBLEM — Z79.899 ENCOUNTER FOR MEDICATION MANAGEMENT: Status: ACTIVE | Noted: 2019-06-11

## 2022-03-19 PROBLEM — G47.10 HYPERSOMNIA: Status: ACTIVE | Noted: 2017-03-20

## 2023-06-23 ENCOUNTER — OFFICE VISIT (OUTPATIENT)
Dept: INTERNAL MEDICINE CLINIC | Facility: CLINIC | Age: 38
End: 2023-06-23
Payer: COMMERCIAL

## 2023-06-23 VITALS
HEART RATE: 89 BPM | BODY MASS INDEX: 22.48 KG/M2 | OXYGEN SATURATION: 100 % | TEMPERATURE: 97.5 F | HEIGHT: 70 IN | DIASTOLIC BLOOD PRESSURE: 72 MMHG | WEIGHT: 157 LBS | SYSTOLIC BLOOD PRESSURE: 104 MMHG

## 2023-06-23 DIAGNOSIS — Z11.9 SCREENING EXAMINATION FOR INFECTIOUS DISEASE: ICD-10-CM

## 2023-06-23 DIAGNOSIS — Z00.00 LABORATORY EXAMINATION ORDERED AS PART OF A COMPLETE PHYSICAL EXAMINATION: ICD-10-CM

## 2023-06-23 DIAGNOSIS — E78.2 MIXED HYPERLIPIDEMIA: ICD-10-CM

## 2023-06-23 DIAGNOSIS — Z00.00 ROUTINE GENERAL MEDICAL EXAMINATION AT A HEALTH CARE FACILITY: Primary | ICD-10-CM

## 2023-06-23 DIAGNOSIS — Z11.59 NEED FOR HEPATITIS C SCREENING TEST: ICD-10-CM

## 2023-06-23 DIAGNOSIS — Z12.5 PROSTATE CANCER SCREENING: ICD-10-CM

## 2023-06-23 DIAGNOSIS — R73.03 PREDIABETES: ICD-10-CM

## 2023-06-23 DIAGNOSIS — Z00.00 ROUTINE GENERAL MEDICAL EXAMINATION AT A HEALTH CARE FACILITY: ICD-10-CM

## 2023-06-23 PROBLEM — G47.10 HYPERSOMNIA: Status: RESOLVED | Noted: 2017-03-20 | Resolved: 2023-06-23

## 2023-06-23 PROBLEM — R29.818 SUSPECTED SLEEP APNEA: Status: RESOLVED | Noted: 2017-03-20 | Resolved: 2023-06-23

## 2023-06-23 PROBLEM — Z79.899 ENCOUNTER FOR MEDICATION MANAGEMENT: Status: RESOLVED | Noted: 2019-06-11 | Resolved: 2023-06-23

## 2023-06-23 PROBLEM — N20.0 KIDNEY STONE: Status: RESOLVED | Noted: 2019-06-10 | Resolved: 2023-06-23

## 2023-06-23 PROBLEM — G44.001 INTRACTABLE CLUSTER HEADACHE SYNDROME: Status: RESOLVED | Noted: 2019-06-11 | Resolved: 2023-06-23

## 2023-06-23 LAB
ALBUMIN SERPL-MCNC: 3.9 G/DL (ref 3.5–5)
ALBUMIN/GLOB SERPL: 1.1 (ref 0.4–1.6)
ALP SERPL-CCNC: 74 U/L (ref 50–136)
ALT SERPL-CCNC: 22 U/L (ref 12–65)
ANION GAP SERPL CALC-SCNC: 7 MMOL/L (ref 2–11)
APPEARANCE UR: CLEAR
AST SERPL-CCNC: 8 U/L (ref 15–37)
BACTERIA URNS QL MICRO: NEGATIVE /HPF
BASOPHILS # BLD: 0 K/UL (ref 0–0.2)
BASOPHILS NFR BLD: 0 % (ref 0–2)
BILIRUB DIRECT SERPL-MCNC: <0.1 MG/DL
BILIRUB SERPL-MCNC: 0.5 MG/DL (ref 0.2–1.1)
BILIRUB UR QL: NEGATIVE
BUN SERPL-MCNC: 13 MG/DL (ref 6–23)
CALCIUM SERPL-MCNC: 9.9 MG/DL (ref 8.3–10.4)
CASTS URNS QL MICRO: ABNORMAL /LPF (ref 0–2)
CHLORIDE SERPL-SCNC: 108 MMOL/L (ref 101–110)
CHOLEST SERPL-MCNC: 172 MG/DL
CO2 SERPL-SCNC: 24 MMOL/L (ref 21–32)
COLOR UR: ABNORMAL
CREAT SERPL-MCNC: 0.9 MG/DL (ref 0.8–1.5)
DIFFERENTIAL METHOD BLD: ABNORMAL
EOSINOPHIL # BLD: 0 K/UL (ref 0–0.8)
EOSINOPHIL NFR BLD: 1 % (ref 0.5–7.8)
EPI CELLS #/AREA URNS HPF: ABNORMAL /HPF (ref 0–5)
ERYTHROCYTE [DISTWIDTH] IN BLOOD BY AUTOMATED COUNT: 13 % (ref 11.9–14.6)
GLOBULIN SER CALC-MCNC: 3.6 G/DL (ref 2.8–4.5)
GLUCOSE SERPL-MCNC: 101 MG/DL (ref 65–100)
GLUCOSE UR STRIP.AUTO-MCNC: NEGATIVE MG/DL
HCT VFR BLD AUTO: 45.1 % (ref 41.1–50.3)
HDLC SERPL-MCNC: 34 MG/DL (ref 40–60)
HDLC SERPL: 5.1
HGB BLD-MCNC: 15.5 G/DL (ref 13.6–17.2)
HGB UR QL STRIP: NEGATIVE
IMM GRANULOCYTES # BLD AUTO: 0 K/UL (ref 0–0.5)
IMM GRANULOCYTES NFR BLD AUTO: 0 % (ref 0–5)
KETONES UR QL STRIP.AUTO: 15 MG/DL
LDLC SERPL CALC-MCNC: 104.2 MG/DL
LEUKOCYTE ESTERASE UR QL STRIP.AUTO: NEGATIVE
LYMPHOCYTES # BLD: 1.8 K/UL (ref 0.5–4.6)
LYMPHOCYTES NFR BLD: 50 % (ref 13–44)
MCH RBC QN AUTO: 31.5 PG (ref 26.1–32.9)
MCHC RBC AUTO-ENTMCNC: 34.4 G/DL (ref 31.4–35)
MCV RBC AUTO: 91.7 FL (ref 82–102)
MONOCYTES # BLD: 0.3 K/UL (ref 0.1–1.3)
MONOCYTES NFR BLD: 9 % (ref 4–12)
NEUTS SEG # BLD: 1.4 K/UL (ref 1.7–8.2)
NEUTS SEG NFR BLD: 40 % (ref 43–78)
NITRITE UR QL STRIP.AUTO: NEGATIVE
NRBC # BLD: 0 K/UL (ref 0–0.2)
PH UR STRIP: 7.5 (ref 5–9)
PLATELET # BLD AUTO: 246 K/UL (ref 150–450)
PMV BLD AUTO: 10 FL (ref 9.4–12.3)
POTASSIUM SERPL-SCNC: 3.9 MMOL/L (ref 3.5–5.1)
PROT SERPL-MCNC: 7.5 G/DL (ref 6.3–8.2)
PROT UR STRIP-MCNC: ABNORMAL MG/DL
RBC # BLD AUTO: 4.92 M/UL (ref 4.23–5.6)
RBC #/AREA URNS HPF: ABNORMAL /HPF (ref 0–5)
SODIUM SERPL-SCNC: 139 MMOL/L (ref 133–143)
SP GR UR REFRACTOMETRY: 1.02 (ref 1–1.02)
TRIGL SERPL-MCNC: 169 MG/DL (ref 35–150)
TSH, 3RD GENERATION: 1.33 UIU/ML (ref 0.36–3.74)
UROBILINOGEN UR QL STRIP.AUTO: 1 EU/DL (ref 0.2–1)
VLDLC SERPL CALC-MCNC: 33.8 MG/DL (ref 6–23)
WBC # BLD AUTO: 3.5 K/UL (ref 4.3–11.1)
WBC URNS QL MICRO: ABNORMAL /HPF (ref 0–4)

## 2023-06-23 PROCEDURE — 99395 PREV VISIT EST AGE 18-39: CPT | Performed by: INTERNAL MEDICINE

## 2023-06-23 PROCEDURE — 90677 PCV20 VACCINE IM: CPT | Performed by: INTERNAL MEDICINE

## 2023-06-23 PROCEDURE — 90471 IMMUNIZATION ADMIN: CPT | Performed by: INTERNAL MEDICINE

## 2023-06-23 SDOH — ECONOMIC STABILITY: HOUSING INSECURITY
IN THE LAST 12 MONTHS, WAS THERE A TIME WHEN YOU DID NOT HAVE A STEADY PLACE TO SLEEP OR SLEPT IN A SHELTER (INCLUDING NOW)?: NO

## 2023-06-23 SDOH — ECONOMIC STABILITY: FOOD INSECURITY: WITHIN THE PAST 12 MONTHS, THE FOOD YOU BOUGHT JUST DIDN'T LAST AND YOU DIDN'T HAVE MONEY TO GET MORE.: NEVER TRUE

## 2023-06-23 SDOH — ECONOMIC STABILITY: INCOME INSECURITY: HOW HARD IS IT FOR YOU TO PAY FOR THE VERY BASICS LIKE FOOD, HOUSING, MEDICAL CARE, AND HEATING?: NOT HARD AT ALL

## 2023-06-23 SDOH — ECONOMIC STABILITY: FOOD INSECURITY: WITHIN THE PAST 12 MONTHS, YOU WORRIED THAT YOUR FOOD WOULD RUN OUT BEFORE YOU GOT MONEY TO BUY MORE.: NEVER TRUE

## 2023-06-23 ASSESSMENT — ENCOUNTER SYMPTOMS
BLOOD IN STOOL: 0
SHORTNESS OF BREATH: 0

## 2023-06-23 ASSESSMENT — PATIENT HEALTH QUESTIONNAIRE - PHQ9
SUM OF ALL RESPONSES TO PHQ9 QUESTIONS 1 & 2: 0
SUM OF ALL RESPONSES TO PHQ QUESTIONS 1-9: 0
2. FEELING DOWN, DEPRESSED OR HOPELESS: 0
SUM OF ALL RESPONSES TO PHQ QUESTIONS 1-9: 0
1. LITTLE INTEREST OR PLEASURE IN DOING THINGS: 0
SUM OF ALL RESPONSES TO PHQ QUESTIONS 1-9: 0
SUM OF ALL RESPONSES TO PHQ QUESTIONS 1-9: 0

## 2023-06-23 ASSESSMENT — ANXIETY QUESTIONNAIRES
3. WORRYING TOO MUCH ABOUT DIFFERENT THINGS: 0
4. TROUBLE RELAXING: 0
1. FEELING NERVOUS, ANXIOUS, OR ON EDGE: 0
GAD7 TOTAL SCORE: 0
IF YOU CHECKED OFF ANY PROBLEMS ON THIS QUESTIONNAIRE, HOW DIFFICULT HAVE THESE PROBLEMS MADE IT FOR YOU TO DO YOUR WORK, TAKE CARE OF THINGS AT HOME, OR GET ALONG WITH OTHER PEOPLE: NOT DIFFICULT AT ALL
5. BEING SO RESTLESS THAT IT IS HARD TO SIT STILL: 0
2. NOT BEING ABLE TO STOP OR CONTROL WORRYING: 0
6. BECOMING EASILY ANNOYED OR IRRITABLE: 0
7. FEELING AFRAID AS IF SOMETHING AWFUL MIGHT HAPPEN: 0

## 2023-06-23 NOTE — PROGRESS NOTES
Kristian Esposito M.D. Internal Medicine  [facility]  [address] [facility], [facility] [address]  Phone: [phone]  Fax: [phone]    [facility]     Renuka Thomas is a 42 y.o. Black / [place] American male. He is here today for a physical, is feeling/doing well w/o complaints, and has the stable chronic problems below. Current Outpatient Medications   Medication Sig Dispense Refill    divalproex (DEPAKOTE ER) 250 MG extended release tablet Take 250 mg by mouth (Patient not taking: Reported on 6/23/2023)      rizatriptan (MAXALT) 10 MG tablet Take 10 mg by mouth daily as needed (Patient not taking: Reported on 6/23/2023)       No current facility-administered medications for this visit. No Known Allergies    Past Medical History:   Diagnosis Date    Cluster headaches     HLD (hyperlipidemia)     Hyperglycemia     Hypersomnia 03/20/2017    Kidney stone 06/10/2019    Kidney stones     Microscopic hematuria      Past Surgical History:   Procedure Laterality Date    OTHER SURGICAL HISTORY  2000    I & D-- LEFT KNEE     Social History     Tobacco Use    Smoking status: Never    Smokeless tobacco: Never   Substance Use Topics    Alcohol use: No     Alcohol/week: 0.0 standard drinks    Drug use: No     Family History   Problem Relation Age of Onset    Migraines Maternal Aunt     Hypertension Paternal Grandmother     Diabetes Maternal Grandmother     Hypertension Mother       Review of Systems   Respiratory:  Negative for shortness of breath. Cardiovascular:  Negative for chest pain. Gastrointestinal:  Negative for blood in stool. Physical Exam  Vitals and nursing note reviewed. Constitutional:       General: He is not in acute distress. Appearance: Normal appearance. He is normal weight. He is not ill-appearing, toxic-appearing or diaphoretic. HENT:      Head: Normocephalic and atraumatic.       Right Ear: External ear normal.      Left Ear: External ear normal.   Eyes:      General: No

## 2023-06-24 LAB
EST. AVERAGE GLUCOSE BLD GHB EST-MCNC: 105 MG/DL
HBA1C MFR BLD: 5.3 % (ref 4.8–5.6)
HCV AB SERPL QL IA: NORMAL
HCV IGG SERPL QL IA: NON REACTIVE S/CO RATIO
PSA SERPL DL<=0.01 NG/ML-MCNC: 0.64 NG/ML (ref 0–4)

## 2023-06-26 LAB — RPR SER QL: NONREACTIVE

## 2023-06-30 LAB
C TRACH RRNA UR QL NAA+PROBE: NEGATIVE
M GENITALIUM DNA SPEC QL NAA+PROBE: NEGATIVE
N GONORRHOEA RRNA UR QL NAA+PROBE: NEGATIVE
SPECIMEN SOURCE: NORMAL

## (undated) DEVICE — CYSTO/BLADDER IRRIGATION SET, REGULATING CLAMP

## (undated) DEVICE — KENDALL SCD EXPRESS SLEEVES, KNEE LENGTH, MEDIUM: Brand: KENDALL SCD

## (undated) DEVICE — CYSTO: Brand: MEDLINE INDUSTRIES, INC.

## (undated) DEVICE — GUIDEWIRE ENDOSCP L150CM DIA0.038IN TIP L3CM PTFE FLX STR

## (undated) DEVICE — SOLUTION IRRIG 3000ML H2O STRL BAG

## (undated) DEVICE — TRAY PREP DRY W/ PREM GLV 2 APPL 6 SPNG 2 UNDPD 1 OVERWRAP

## (undated) DEVICE — CATH URET 5FRX70CM W/OPN END -- BX/20

## (undated) DEVICE — GUIDEWIRE URO L150CM DIA0.038IN TIP L3CM STR FLX TIP DISP